# Patient Record
Sex: MALE | Employment: UNEMPLOYED | URBAN - METROPOLITAN AREA
[De-identification: names, ages, dates, MRNs, and addresses within clinical notes are randomized per-mention and may not be internally consistent; named-entity substitution may affect disease eponyms.]

---

## 2018-07-27 PROBLEM — M23.321 DEGENERATIVE TEAR OF POSTERIOR HORN OF MEDIAL MENISCUS OF RIGHT KNEE: Status: ACTIVE | Noted: 2018-07-27

## 2020-12-10 ENCOUNTER — TELEPHONE (OUTPATIENT)
Dept: ORTHOPEDICS | Facility: CLINIC | Age: 65
End: 2020-12-10

## 2020-12-10 NOTE — TELEPHONE ENCOUNTER
Spoke with Dr. Henson with [facility]. He said that the pt has a mildly displaced tibial plateau fracture and wants to know if the pt needs to be admitted to be observed for compartment syndrome or can he follow [facility]. Contacted JULISA Mansfield via telephone and gave her the number to contact Dr. Henson to discuss this issue directly.

## 2021-05-25 PROBLEM — R29.898 WEAKNESS OF BOTH LOWER EXTREMITIES: Status: ACTIVE | Noted: 2021-05-25

## 2021-05-25 PROBLEM — R26.89 POOR BALANCE: Status: ACTIVE | Noted: 2021-05-25
